# Patient Record
Sex: MALE | Race: WHITE
[De-identification: names, ages, dates, MRNs, and addresses within clinical notes are randomized per-mention and may not be internally consistent; named-entity substitution may affect disease eponyms.]

---

## 2021-03-31 ENCOUNTER — HOSPITAL ENCOUNTER (EMERGENCY)
Dept: HOSPITAL 63 - ER | Age: 66
Discharge: HOME | End: 2021-03-31
Payer: COMMERCIAL

## 2021-03-31 VITALS
BODY MASS INDEX: 26.12 KG/M2 | WEIGHT: 176.37 LBS | HEIGHT: 69 IN | SYSTOLIC BLOOD PRESSURE: 124 MMHG | DIASTOLIC BLOOD PRESSURE: 69 MMHG
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
## 2021-03-31 DIAGNOSIS — S89.92XA: Primary | ICD-10-CM
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
## 2021-03-31 PROCEDURE — 99283 EMERGENCY DEPT VISIT LOW MDM: CPT

## 2021-03-31 PROCEDURE — 73562 X-RAY EXAM OF KNEE 3: CPT

## 2021-03-31 PROCEDURE — 29505 APPLICATION LONG LEG SPLINT: CPT

## 2021-03-31 NOTE — RAD
KNEE 3 VIEWS LEFT



Clinical Indication: Reason: twisting injury, pain, swelling / 



Comparison: None.



Findings: 

There is no acute fracture or dislocation. The tricompartmental joint spaces are maintained. The house
lla is in anatomic position. There are patellar osteophytes and patellar enthesophytes. There is also
 a well-corticated ossific body measuring 1.8 cm associated with the quadriceps tendon near but separ
ate from the patella. The quadriceps tendon appears thickened. There is prepatellar soft tissue swell
ing. There is no significant joint effusion.



IMPRESSION:

1.  No acute fracture.

2.  Calcific tendinosis of the quadriceps tendon. The quadriceps tendon appears thickened.



Electronically signed by: Eh Huang MD (3/31/2021 2:59 AM) RONEL

## 2021-03-31 NOTE — PHYS DOC
General Adult


EDM:


Chief Complaint:  KNEE INJURY





HPI:


HPI:





Patient is a 66-year-old male coming in for left knee pain and swelling.  

Patient was going down about 6 steps an hour prior to arrival but says he lost 

his balance on the second step from the bottom and fell feeling his left knee 

twisted under him.  Denies any head injury.  Has not been able to bear weight.  

Has a history of prior knee surgery secondary to injury on his right knee about 

20 years ago.  States that he has a history of arthritis in both knees.  Pain in

left knee is around the medial and anterior aspect of the knee.  States it does 

not hurt much when he is holding a straight.  Did not not take anything for pain

prior to arrival patient states he otherwise has been well.





Review of Systems:


Review of Systems:


All other systems within normal limits except for as noted in the HPI





Current Medications:


Current Meds:





Current Medications








 Medications


  (Trade)  Dose


 Ordered  Sig/Peter  Start Time


 Stop Time Status Last Admin


Dose Admin


 


 Acetaminophen/


 Hydrocodone Bitart


  (Lortab 5/325)  1 tab  1X  ONCE  3/31/21 02:15


 3/31/21 02:16 UNV  














Physical Exam:


PE:


Constitutional: Well developed, well nourished, no acute distress, non-toxic 

appearance. []


HENT: Normocephalic, atraumatic, bilateral external ears normal,  nose normal. 

[]


Eyes: PERRLA, conjunctiva normal, no discharge. [] 


Neck: No rigidity, supple, no stridor. [] 


Cardiovascular: Regular rate and rhythm, brisk cap refill []


Lungs & Thorax: Non labored symmetric respirations, no tachypnea or respiratory 

distress []


Abdomen: Soft, nondistended.


Skin: Warm, dry, no erythema, no rash. [] 


Back: Unremarkable


Extremities: No deformities, range of motion grossly intact, no lower extremity 

edema.  Left knee: Joint effusion, tenderness on medial joint line [] 


Neurologic: Alert and oriented X 3, no focal deficits noted. []


Psychologic: Affect normal, judgement normal, mood normal. []





EKG:


EKG:


[]





Radiology/Procedures:


Radiology/Procedures:





KNEE 3 VIEWS LEFT





Clinical Indication: Reason: twisting injury, pain, swelling / 





Comparison: None.





Findings: 


There is no acute fracture or dislocation. The tricompartmental joint spaces are

 maintained. The patella is in anatomic position. There are patellar osteophytes

 and patellar enthesophytes. There is also a well-corticated ossific body 

measuring 1.8 cm associated with the quadriceps tendon near but separate from 

the patella. The quadriceps tendon appears thickened. There is prepatellar soft 

tissue swelling. There is no significant joint effusion.





IMPRESSION:


1.  No acute fracture.


2.  Calcific tendinosis of the quadriceps tendon. The quadriceps tendon appears 

thickened.


[]





Heart Score:


C/O Chest Pain:  No


Risk Factors:


Risk Factors:  DM, Current or recent (<one month) smoker, HTN, HLP, family 

history of CAD, obesity.


Risk Scores:


Score 0 - 3:  2.5% MACE over next 6 weeks - Discharge Home


Score 4 - 6:  20.3% MACE over next 6 weeks - Admit for Clinical Observation


Score 7 - 10:  72.7% MACE over next 6 weeks - Early Invasive Strategies





Course & Med Decision Making:


Course & Med Decision Making


Pertinent Labs and Imaging studies reviewed. (See chart for details)





[]





Dragon Disclaimer:


Dragon Disclaimer:


This electronic medical record was generated, in whole or in part, using a voice

 recognition dictation system.





Departure


Departure:


Impression:  


   Primary Impression:  


   Left knee injury


Disposition:  01 DC HOME SELF CARE/HOMELESS


Condition:  IMPROVED


Referrals:  


[facility]


Patient Instructions:  Knee - Cartilage (Meniscus) Injury


Scripts


Hydrocodone Bit/Acetaminophen (HYDROCODONE-APAP 5-325  **) 1 Each Tablet


1 TAB PO PRN Q6HRS PRN for PAIN for 5 Days, #15 TAB 0 Refills


   Caution: this medication can make you drowsy. Do not drive


   or operate heavy machinery when using this medication.


   Prov: TITO RHODES MD         3/31/21











TITO RHODES MD            Mar 31, 2021 02:17

## 2021-04-09 ENCOUNTER — HOSPITAL ENCOUNTER (OUTPATIENT)
Dept: HOSPITAL 61 - SURG | Age: 66
Discharge: HOME | End: 2021-04-09
Attending: ORTHOPAEDIC SURGERY
Payer: COMMERCIAL

## 2021-04-09 VITALS — HEIGHT: 69 IN | WEIGHT: 160 LBS | BODY MASS INDEX: 23.7 KG/M2

## 2021-04-09 VITALS — SYSTOLIC BLOOD PRESSURE: 146 MMHG | DIASTOLIC BLOOD PRESSURE: 52 MMHG

## 2021-04-09 DIAGNOSIS — E78.00: ICD-10-CM
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
## 2021-04-09 DIAGNOSIS — S76.112A: Primary | ICD-10-CM
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
## 2021-04-09 DIAGNOSIS — Z98.890: ICD-10-CM

## 2021-04-09 DIAGNOSIS — M19.90: ICD-10-CM

## 2021-04-09 DIAGNOSIS — Z79.82: ICD-10-CM

## 2021-04-09 DIAGNOSIS — I10: ICD-10-CM

## 2021-04-09 DIAGNOSIS — Z72.89: ICD-10-CM

## 2021-04-09 DIAGNOSIS — Z79.899: ICD-10-CM
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
## 2021-04-09 PROCEDURE — A6449 LT COMPRES BAND >=3" <5"/YD: HCPCS

## 2021-04-09 PROCEDURE — 27385 REPAIR OF THIGH MUSCLE: CPT

## 2021-04-09 PROCEDURE — A4930 STERILE, GLOVES PER PAIR: HCPCS

## 2021-04-09 PROCEDURE — A6253 ABSORPT DRG > 48 SQ IN W/O B: HCPCS

## 2021-04-09 PROCEDURE — A6402 STERILE GAUZE <= 16 SQ IN: HCPCS

## 2021-04-09 PROCEDURE — A6450 LT COMPRES BAND >=5"/YD: HCPCS

## 2021-04-09 RX ADMIN — HYDROMORPHONE HYDROCHLORIDE PRN MG: 2 INJECTION INTRAMUSCULAR; INTRAVENOUS; SUBCUTANEOUS at 09:14

## 2021-04-09 RX ADMIN — MORPHINE SULFATE PRN MG: 2 INJECTION, SOLUTION INTRAMUSCULAR; INTRAVENOUS at 09:14

## 2021-04-09 RX ADMIN — HYDROMORPHONE HYDROCHLORIDE PRN MG: 2 INJECTION INTRAMUSCULAR; INTRAVENOUS; SUBCUTANEOUS at 09:24

## 2021-04-09 RX ADMIN — SODIUM CHLORIDE, SODIUM LACTATE, POTASSIUM CHLORIDE, AND CALCIUM CHLORIDE SCH MLS/HR: .6; .31; .03; .02 INJECTION, SOLUTION INTRAVENOUS at 06:46

## 2021-04-09 RX ADMIN — HYDROMORPHONE HYDROCHLORIDE PRN MG: 2 INJECTION INTRAMUSCULAR; INTRAVENOUS; SUBCUTANEOUS at 09:36

## 2021-04-09 RX ADMIN — MORPHINE SULFATE PRN MG: 2 INJECTION, SOLUTION INTRAMUSCULAR; INTRAVENOUS at 09:01

## 2021-04-09 RX ADMIN — SODIUM CHLORIDE, SODIUM LACTATE, POTASSIUM CHLORIDE, AND CALCIUM CHLORIDE SCH MLS/HR: .6; .31; .03; .02 INJECTION, SOLUTION INTRAVENOUS at 07:44

## 2021-04-09 RX ADMIN — HYDROMORPHONE HYDROCHLORIDE PRN MG: 2 INJECTION INTRAMUSCULAR; INTRAVENOUS; SUBCUTANEOUS at 09:47

## 2021-04-09 NOTE — PDOC4
Operative Note


Operative Note


Date of surgery: 4/9/2021





Preoperative diagnosis: Left quadriceps tendon rupture





Postoperative diagnosis: Same





Operative procedure: Left quadriceps tendon rupture repair





Surgeon: Nitin





Assistant: Colton casey





Anesthesia: General





Estimated blood loss: 50 cc





Complications: None





Operative indications: Please see my orthopedic clinic note for detailed 

operative indications and note that we had covered risks of infection possible 

nonhealing stiffness nerve or blood vessel damage medical or other anesthetic 

complications continued pain among others all his questions were answered he 

wishes to proceed with surgical evaluation and treatment.





Operative text: Patient was identified procedure verified patient placed in the 

supine position on the operating table.  After adequate amounts of general 

anesthesia were administered the left lower extremity was prepped and draped in 

the standard sterile fashion with a thigh tourniquet.  After timeout was 

performed patient procedure identified and verified the left lower extremity was

exsanguinated by Esmarch bandage tourniquet inflated to 250 mmHg a midline 

incision was made and the full-thickness quadriceps rupture was identified from 

the superior aspect of the patella.  The calcified area visualized on x-ray was 

excised to allow apposition to good tendon tissue.  Superior aspect of the 

patella was debrided back to stable bleeding bony tissue and a total of 3 holes 

were drilled longitudinally.  Thorough irrigation was carried out with normal 

saline solution and then #5 max braid sutures were placed in a grasping fashion 

to allow a total of 6 strands across the repair and total placed both from the 

middle to the medial and lateral holes respectively and from the medial to 

lateral hole and sutures were matched up threaded through and tied over the 

distal patella onto bone avoiding any compromise of the patellar tendon.  

Excellent apposition was noted and the retinaculum was further closed with #1 

Vicryl in a running fashion to close the retinaculum and joint capsule.  He was 

noted to have good apposition of the repair and was able to flex about 0 to 30 

degrees without significant tension.  Subcutaneous closure accomplished with 

buried Vicryl suture skin closure with staples sterile compressive dressings 

were placed followed by a postop knee brace that was locked in extension.  Toes 

were noted to be warm pink following deflation of the tourniquet patient was 

returned to recovery room stable condition having tolerated procedure well.  

Colton casey was present for the procedure assisted in patient 

prepping draping retraction closure dressings and brace placement











TAMARA TARANGO MD            Apr 9, 2021 15:34

## 2021-04-09 NOTE — DISCH
DISCHARGE INSTRUCTIONS


Condition on Discharge


Condition on Discharge:  Stable





Activity After Discharge


Activity Instructions for Disc:  Other, see below (keep brace locked in exte

nsion for walking or transfers, may remove for laying/sitting)


Weight Bearing Status after Di:  As tolerated





Diet after Discharge


Diet after Discharge:  Regular





Wound Incision Care


Wound/Incision Care:  Change dressing (may remove dressings in 4 days, wrap with

saran wrap for shower if no drainage, no soaking)





Contacting the DRKen after DC


Call your doctor for:  Concerns you may have





Follow-Up


Follow up with:  Dr. Taveras or James 2 weeks











TAMARA TAVERAS MD            Apr 9, 2021 09:21